# Patient Record
Sex: MALE | Race: WHITE | Employment: OTHER | URBAN - NONMETROPOLITAN AREA
[De-identification: names, ages, dates, MRNs, and addresses within clinical notes are randomized per-mention and may not be internally consistent; named-entity substitution may affect disease eponyms.]

---

## 2020-02-12 ENCOUNTER — OFFICE VISIT (OUTPATIENT)
Dept: PRIMARY CARE CLINIC | Age: 35
End: 2020-02-12
Payer: COMMERCIAL

## 2020-02-12 VITALS
TEMPERATURE: 100 F | WEIGHT: 228 LBS | SYSTOLIC BLOOD PRESSURE: 133 MMHG | RESPIRATION RATE: 16 BRPM | BODY MASS INDEX: 30.88 KG/M2 | DIASTOLIC BLOOD PRESSURE: 76 MMHG | HEART RATE: 88 BPM | HEIGHT: 72 IN

## 2020-02-12 LAB
INFLUENZA A ANTIBODY: POSITIVE
INFLUENZA B ANTIBODY: NEGATIVE

## 2020-02-12 PROCEDURE — 87804 INFLUENZA ASSAY W/OPTIC: CPT | Performed by: NURSE PRACTITIONER

## 2020-02-12 PROCEDURE — 99213 OFFICE O/P EST LOW 20 MIN: CPT | Performed by: NURSE PRACTITIONER

## 2020-02-12 RX ORDER — OSELTAMIVIR PHOSPHATE 75 MG/1
75 CAPSULE ORAL 2 TIMES DAILY
Qty: 10 CAPSULE | Refills: 0 | Status: SHIPPED | OUTPATIENT
Start: 2020-02-12 | End: 2020-02-17

## 2020-02-12 RX ORDER — BENZONATATE 100 MG/1
100 CAPSULE ORAL 3 TIMES DAILY PRN
Qty: 21 CAPSULE | Refills: 0 | Status: SHIPPED | OUTPATIENT
Start: 2020-02-12 | End: 2020-02-19

## 2020-02-12 ASSESSMENT — ENCOUNTER SYMPTOMS
COUGH: 1
NAUSEA: 0
RHINORRHEA: 1
SORE THROAT: 1
VOMITING: 0
SHORTNESS OF BREATH: 0
WHEEZING: 0
DIARRHEA: 1

## 2020-02-12 NOTE — PROGRESS NOTES
[facility]  [address] [address]  Dept: [phone]  Dept Fax: [phone]     Cinthya Dillon is a 37 y.o. male who presents to the [facility] today for hismedical conditions/complaints as noted below. Cinthya Dillon is c/o of Cough (pt c/o fever, body aches, cough, congestion, headache x 2 days.); Congestion; Headache; Generalized Body Aches; and Fever      HPI:     Cough   This is a new problem. The current episode started in the past 7 days (Started 2 days ago with dry and productive cough, nasal congestion, runny nose, sore throat, headaches, body aches and fever. ). The problem has been gradually worsening. The problem occurs every few minutes. The cough is non-productive. Associated symptoms include chills, a fever, headaches, myalgias, nasal congestion, rhinorrhea, a sore throat and sweats. Pertinent negatives include no rash, shortness of breath or wheezing. Associated symptoms comments: Sneezing spells. Diarrhea at times. . Nothing aggravates the symptoms. Treatments tried: Ibuprofen, Tylenol Cold and Flu about an hour ago. The treatment provided no relief. There is no history of asthma, bronchitis, environmental allergies or pneumonia. Past Medical History:   Diagnosis Date    Chronic back pain     Depression     Gastric ulcer, unspecified as acute or chronic, without mention of hemorrhage, perforation, or obstruction     Shoulder pain, right         Current Outpatient Medications   Medication Sig Dispense Refill    oseltamivir (TAMIFLU) 75 MG capsule Take 1 capsule by mouth 2 times daily for 5 days 10 capsule 0    benzonatate (TESSALON PERLES) 100 MG capsule Take 1 capsule by mouth 3 times daily as needed for Cough (Swallow whole. No more than 3 doses in 24 hrs.) 21 capsule 0     No current facility-administered medications for this visit.        No Known Allergies    Subjective:     Review of Systems Constitutional: Positive for appetite change (Not much of an appetite), chills, diaphoresis, fatigue and fever. HENT: Positive for congestion, rhinorrhea and sore throat. Respiratory: Positive for cough. Negative for shortness of breath and wheezing. Gastrointestinal: Positive for diarrhea. Negative for nausea and vomiting. Musculoskeletal: Positive for myalgias. Skin: Negative for rash and wound. Allergic/Immunologic: Negative for environmental allergies. Neurological: Positive for dizziness (Tad bit.), light-headedness and headaches. Objective:      Physical Exam  Vitals signs and nursing note reviewed. Constitutional:       General: He is not in acute distress. Appearance: He is well-developed. He is ill-appearing. He is not diaphoretic. Comments: Arrives ambulatory with wife. Well-hydrated, ill-appearing. Alert, active and cooperative with exam.   HENT:      Head: Normocephalic and atraumatic. Right Ear: Hearing, tympanic membrane, ear canal and external ear normal. No middle ear effusion. No mastoid tenderness. Tympanic membrane is not injected, erythematous or bulging. Left Ear: Hearing, tympanic membrane, ear canal and external ear normal.  No middle ear effusion. No mastoid tenderness. Tympanic membrane is not injected, erythematous or bulging. Nose: Mucosal edema, congestion and rhinorrhea present. Right Sinus: No maxillary sinus tenderness or frontal sinus tenderness. Left Sinus: No maxillary sinus tenderness or frontal sinus tenderness. Mouth/Throat:      Lips: Pink. Mouth: Mucous membranes are moist.      Pharynx: Oropharynx is clear. Uvula midline. No pharyngeal swelling, oropharyngeal exudate or posterior oropharyngeal erythema. Tonsils: Swellin on the right. 0 on the left. Eyes:      General:         Right eye: No discharge. Left eye: No discharge.       Conjunctiva/sclera: Conjunctivae normal.      Pupils: Pupils are equal, round, and reactive to light. Cardiovascular:      Rate and Rhythm: Normal rate and regular rhythm. Heart sounds: Normal heart sounds, S1 normal and S2 normal. No murmur. No friction rub. No gallop. Pulmonary:      Effort: Pulmonary effort is normal. No accessory muscle usage or respiratory distress. Breath sounds: Normal breath sounds and air entry. No decreased breath sounds, wheezing, rhonchi or rales. Comments: Occasional moist cough. Breath sounds clear B/L anterior and posterior lobes. Chest expansion symmetrical.  No audible wheezing or respiratory distress. No rales or rhonchi. Musculoskeletal: Normal range of motion. Lymphadenopathy:      Cervical: No cervical adenopathy. Right cervical: No superficial or posterior cervical adenopathy. Left cervical: No superficial or posterior cervical adenopathy. Skin:     General: Skin is warm and dry. Coloration: Skin is not pale. Findings: No erythema or rash. Neurological:      Mental Status: He is alert and oriented to person, place, and time. Psychiatric:         Behavior: Behavior normal. Behavior is cooperative. /76 (Site: Left Upper Arm, Position: Sitting, Cuff Size: Large Adult)   Pulse 88   Temp 100 °F (37.8 °C) (Oral)   Resp 16   Ht 6' (1.829 m)   Wt 228 lb (103.4 kg)   BMI 30.92 kg/m²     Results for orders placed or performed in visit on 02/12/20   POCT Influenza A/B   Result Value Ref Range    Influenza A Ab positive     Influenza B Ab negative      Assessment:      Diagnosis Orders   1. Influenza A  oseltamivir (TAMIFLU) 75 MG capsule    benzonatate (TESSALON PERLES) 100 MG capsule   2. Fever, unspecified fever cause  POCT Influenza A/B   3. Cough  POCT Influenza A/B       Plan:      Return if symptoms worsen or fail to improve, for Resume all previous medications as directed.        Orders Placed This Encounter   Medications    oseltamivir (TAMIFLU) 75 MG capsule Sig: Take 1 capsule by mouth 2 times daily for 5 days     Dispense:  10 capsule     Refill:  0    benzonatate (TESSALON PERLES) 100 MG capsule     Sig: Take 1 capsule by mouth 3 times daily as needed for Cough (Swallow whole. No more than 3 doses in 24 hrs.)     Dispense:  21 capsule     Refill:  0      · Practice meticulous handwashing and cover cough to prevent spread of infection. · Do not return to work or school until symptoms have resolved and no fever for 24 hrs without medication. · Tamiflu 1 tablet twice a day for 5 days. Take all doses until completed. · Tessalon perles, 1 capsule every 8 hours as needed for cough, no more than 3 doses in 24 hours, swallow whole and do not take with other cough meds. · Requests work note for Regine Desir, reports that he needs to be off at least 8 days to prevent getting pointed every day. · Encouraged to increase fluids and rest  · Tylenol/Ibuprofen OTC PRN for pain, discomfort or fever as directed on package  · Warm salt water gargles for sore throat  · Cool mist humidifier  · Hot tea with honey and lemon for cough PRN  · Patient instructions given for influenza, tamiflu and tessalon perles. · To ER or call 911 if any difficulty breathing, shortness of breath, inability to swallow, hives, rash, facial/tongue swelling or temp greater than 103 degrees. · Follow up with PCP or Walk in Care as needed if symptoms worsen or do not improve. Lizbeth Stern received counseling on the following healthy behaviors: medication adherence. Patient given educational materials - see patient instructions. Discussed use,benefit, and side effects of prescribed medications. Treatment plan discussed at visit. Continue routine health care follow up. All patient questions answered. Pt voiced understanding.       Electronically signed by ABELINO Bhatti CNP on 2/12/2020 at 1:48 PM

## 2020-02-12 NOTE — PATIENT INSTRUCTIONS
good.  · Breathe moist air from a hot shower or from a sink filled with hot water to help clear a stuffy nose. · Before you use cough and cold medicines, check the label. These medicines may not be safe for young children or for people with certain health problems. · If the skin around your nose and lips becomes sore, put some petroleum jelly on the area. · To ease coughing:  ? Drink fluids to soothe a scratchy throat. ? Suck on cough drops or plain hard candy. ? Take an over-the-counter cough medicine that contains dextromethorphan to help you get some sleep. Read and follow all instructions on the label. ? Raise your head at night with an extra pillow. This may help you rest if coughing keeps you awake. · Take any prescribed medicine exactly as directed. Call your doctor if you think you are having a problem with your medicine. To avoid spreading the flu  · Wash your hands regularly, and keep your hands away from your face. · Stay home from school, work, and other public places until you are feeling better and your fever has been gone for at least 24 hours. The fever needs to have gone away on its own without the help of medicine. · Ask people living with you to talk to their doctors about preventing the flu. They may get antiviral medicine to keep from getting the flu from you. · To prevent the flu in the future, get a flu vaccine every fall. Encourage people living with you to get the vaccine. · Cover your mouth when you cough or sneeze. When should you call for help? Call 911 anytime you think you may need emergency care.  For example, call if:    · You have severe trouble breathing.    Call your doctor now or seek immediate medical care if:    · You have new or worse trouble breathing.     · You seem to be getting much sicker.     · You feel very sleepy or confused.     · You have a new or higher fever.     · You get a new rash.    Watch closely for changes in your health, and be sure to contact your doctor if:    · You begin to get better and then get worse.     · You are not getting better after 1 week. Where can you learn more? Go to [email or web address]://[email or web address] org and sign in to your [employer] account. Enter L076 in the [facility] box to learn more about \"Influenza (Flu): Care Instructions. \"     If you do not have an account, please click on the \"Sign Up Now\" link. Current as of: June 9, 2019  Content Version: 12.3  © 9971-5135 [employer]. Care instructions adapted under license by [facility] ([facility]). If you have questions about a medical condition or this instruction, always ask your healthcare professional. [address] any warranty or liability for your use of this information. Patient Education        oseltamivir  Pronunciation:  os el ROJAS ih veer  Brand:  Tamiflu  What is the most important information I should know about oseltamivir? Some people using oseltamivir have had sudden unusual changes in mood or behavior, most often in children. It is not certain that oseltamivir is the exact cause. Even without using oseltamivir, anyone with influenza can have neurologic or behavioral effects that may lead to confusion or hallucinations. Call your doctor right away if the person using this medicine has any signs of unusual thoughts or behavior. What is oseltamivir? Oseltamivir is an antiviral medication that blocks the actions of influenza virus types A and B in your body. Oseltamivir is used to treat flu symptoms caused by influenza virus in people who have had symptoms for less than 2 days. Oseltamivir may also be given to prevent influenza in people who may be exposed but do not yet have symptoms. Oseltamivir will not treat the common cold. Oseltamivir should not be used in place of getting a yearly flu shot.  The Centers for Disease Control recommends an annual flu shot to help protect you each year from new strains of influenza your stomach. To treat  flu symptoms: Take oseltamivir every 12 hours for 5 days. To prevent  flu symptoms: Take oseltamivir every 24 hours for 10 days or as prescribed. Follow your doctor's instructions. Read and carefully follow any Instructions for Use provided with your medicine. Ask your doctor or pharmacist if you do not understand these instructions. Use this medicine for the full prescribed length of time, even if your symptoms quickly improve. Tell your doctor if your symptoms do not improve, or if they get worse. Store oseltamivir capsules at room temperature away from moisture and heat. Store oseltamivir liquid in the refrigerator but do not freeze. Throw away any unused liquid after 17 days. The liquid may also be stored at cool room temperature for up to 10 days  What happens if I miss a dose? Use the medicine as soon as you can, but skip the missed dose if your next dose is due in less than 2 hours. Do not use two doses at one time. What happens if I overdose? Seek emergency medical attention or call the [employer] Help line at 1-491.562.5843. What should I avoid while taking oseltamivir? Do not use a nasal flu vaccine (FluMist) within 48 hours after taking oseltamivir. Oseltamivir may interfere with the drug action of FluMist, making the vaccine less effective. Follow your doctor's instructions. What are the possible side effects of oseltamivir? Get emergency medical help if you have signs of an allergic reaction (hives, difficult breathing, swelling in your face or throat) or a severe skin reaction (fever, sore throat, burning eyes, skin pain, red or purple skin rash with blistering and peeling). Some people using oseltamivir (especially children) have had sudden unusual changes in mood or behavior. It is not certain that oseltamivir is the exact cause of these symptoms. Even without using oseltamivir, anyone with influenza can have neurologic or behavioral symptoms.  Call your doctor right away if the person using this medicine has:  · sudden confusion;  · tremors or shaking;  · unusual behavior; or  · hallucinations (hearing or seeing things that are not there). Common side effects may include:  · nausea, vomiting;  · headache; or  · pain. This is not a complete list of side effects and others may occur. Call your doctor for medical advice about side effects. You may report side effects to FDA at 3-081-XTS-1826. What other drugs will affect oseltamivir? Other drugs may affect oseltamivir, including prescription and over-the-counter medicines, vitamins, and herbal products. Tell your doctor about all your current medicines and any medicine you start or stop using. Where can I get more information? Your pharmacist can provide more information about oseltamivir. Remember, keep this and all other medicines out of the reach of children, never share your medicines with others, and use this medication only for the indication prescribed. Every effort has been made to ensure that the information provided by [address] is accurate, up-to-date, and complete, but no guarantee is made to that effect. Drug information contained herein may be time sensitive. [facility] information has been compiled for use by healthcare practitioners and consumers in the [facility] and therefore [facility] does not warrant that uses outside of the [facility] are appropriate, unless specifically indicated otherwise. [facility] drug information does not endorse drugs, diagnose patients or recommend therapy. [facility] drug information is an informational resource designed to assist licensed healthcare practitioners in caring for their patients and/or to serve consumers viewing this service as a supplement to, and not a substitute for, the expertise, skill, knowledge and judgment of healthcare practitioners.  The absence of a warning for a given drug or drug combination in no way should be construed to indicate that the drug or drug combination is safe, effective or appropriate for any given patient. [facility] does not assume any responsibility for any aspect of healthcare administered with the aid of information [facility] provides. The information contained herein is not intended to cover all possible uses, directions, precautions, warnings, drug interactions, allergic reactions, or adverse effects. If you have questions about the drugs you are taking, check with your doctor, nurse or pharmacist.  Copyright 7461-6855 [address]: 12.02. Revision date: 9/25/2018. Care instructions adapted under license by [facility] ([facility]). If you have questions about a medical condition or this instruction, always ask your healthcare professional. [place] [address] any warranty or liability for your use of this information. Patient Education        benzonatate  Pronunciation:  leonie ricardo  Brand:  Tessalon, Tessalon Perles, Zonatuss  What is the most important information I should know about benzonatate? You should not use this medication if you are allergic to benzonatate or topical numbing medicines such as tetracaine or procaine (found in some insect bite and sunburn creams). Never suck or chew on a benzonatate capsule. Swallow the pill whole. Sucking or chewing the capsule may cause your mouth and throat to feel numb or cause other serious side effects. Serious side effects of benzonatate include choking feeling, chest pain or numbness, feeling like you might pass out, confusion, or hallucinations. Some of these side effects may result from chewing or sucking on a benzonatate capsule. Do not give this medication to a child younger than 8years old without medical advice. An overdose of benzonatate can be fatal to a child. What is benzonatate? Benzonatate is a non-narcotic cough medicine. It works by numbing the throat and lungs, making the cough reflex less active.   Benzonatate is used to relieve more information about benzonatate. Remember, keep this and all other medicines out of the reach of children, never share your medicines with others, and use this medication only for the indication prescribed. Every effort has been made to ensure that the information provided by [address] is accurate, up-to-date, and complete, but no guarantee is made to that effect. Drug information contained herein may be time sensitive. [facility] information has been compiled for use by healthcare practitioners and consumers in the [place] and therefore [facility] does not warrant that uses outside of the [place] are appropriate, unless specifically indicated otherwise. [facility]'s drug information does not endorse drugs, diagnose patients or recommend therapy. [facility]'s drug information is an informational resource designed to assist licensed healthcare practitioners in caring for their patients and/or to serve consumers viewing this service as a supplement to, and not a substitute for, the expertise, skill, knowledge and judgment of healthcare practitioners. The absence of a warning for a given drug or drug combination in no way should be construed to indicate that the drug or drug combination is safe, effective or appropriate for any given patient. [facility] does not assume any responsibility for any aspect of healthcare administered with the aid of information [facility] provides. The information contained herein is not intended to cover all possible uses, directions, precautions, warnings, drug interactions, allergic reactions, or adverse effects. If you have questions about the drugs you are taking, check with your doctor, nurse or pharmacist.  Copyright 1709-3134 [address]. Version: 8.01. Revision date: 3/9/2011. Care instructions adapted under license by [facility] ([facility]).  If you have questions about a medical condition or this instruction, always ask your healthcare professional. Niru Zelaya